# Patient Record
(demographics unavailable — no encounter records)

---

## 2025-07-22 NOTE — REASON FOR VISIT
[Symptom and Test Evaluation] : symptom and test evaluation [FreeTextEntry1] : pulmonary embolism and deep vein thrombosis

## 2025-07-22 NOTE — DISCUSSION/SUMMARY
[Patient] : the patient [Risks] : risks [Benefits] : benefits [Alternatives] : alternatives [With Me] : with me [___ Month(s)] : in [unfilled] month(s) [FreeTextEntry1] : This is a 42  year old male wit history of deep vein thrombosis and PE. here for [id number] care  1)   hypertension  :  initiate losartan HCTZ  160  2)  deep vein throm bosis and pulmonary embolism :  recurrent DVT .   unprovoked.  ct xarelto., US venous Duplex.  exercise stress echo.  to evalaute fo rdynamci pulm HTN   2) Low vid D: diet and exercise.   3) healt maintiance:  Lipid profile.         [EKG obtained to assist in diagnosis and management of assessed problem(s)] : EKG obtained to assist in diagnosis and management of assessed problem(s)

## 2025-07-22 NOTE — CARDIOLOGY SUMMARY
[de-identified] : 10 21 2022 \par  \par  7/14/2021  [de-identified] : lipi dporfile . :  LDL : 82.  HDl 37.  Total 132.  T

## 2025-07-22 NOTE — HISTORY OF PRESENT ILLNESS
[FreeTextEntry1] : pulmonary embolism and deep vein thrombosis   HPI for today:  7 22 2025:   he has been a lot of stress. no chest paion . no diziznes. no syncope.  he drinks alcohol 3 days  aweek.  no syncope no recnet ER visit   old note:  he has been very stressed recently.   n9o chest pain . no dyspnea on exertion . no leg swelling,.   old note: This is a 38 year old male with history of deep vein thrombosis , pulmonary embolism , 4 years ago, here for [facility] follow up care and establish care.  he has been taking xarelto for 4 years. he never saw a hematologist.   they got all the testing done in hospital. he has been takign xarelto si9nce then.  No headacehs. no dizziness. no sycnope he states 4 years ago he was driving to [employer] in the car.  he started having cramps.   4 years ago he had a DVT and PE provoked.  he is not able to drive his bike, he is a .  he had a recent fall and was bleeding.  he is uncomfortabel with the blood thinners.  No Bleeding.  he ran out of blood thinners now. only one pill left.